# Patient Record
Sex: FEMALE | Race: WHITE | NOT HISPANIC OR LATINO | URBAN - METROPOLITAN AREA
[De-identification: names, ages, dates, MRNs, and addresses within clinical notes are randomized per-mention and may not be internally consistent; named-entity substitution may affect disease eponyms.]

---

## 2024-04-05 ENCOUNTER — EMERGENCY (EMERGENCY)
Facility: HOSPITAL | Age: 24
LOS: 1 days | Discharge: HOME | End: 2024-04-05
Attending: STUDENT IN AN ORGANIZED HEALTH CARE EDUCATION/TRAINING PROGRAM | Admitting: STUDENT IN AN ORGANIZED HEALTH CARE EDUCATION/TRAINING PROGRAM
Payer: COMMERCIAL

## 2024-04-05 VITALS
WEIGHT: 125 LBS | DIASTOLIC BLOOD PRESSURE: 78 MMHG | TEMPERATURE: 97 F | HEART RATE: 93 BPM | HEIGHT: 62 IN | RESPIRATION RATE: 18 BRPM | OXYGEN SATURATION: 97 % | SYSTOLIC BLOOD PRESSURE: 118 MMHG

## 2024-04-05 VITALS
DIASTOLIC BLOOD PRESSURE: 71 MMHG | SYSTOLIC BLOOD PRESSURE: 111 MMHG | OXYGEN SATURATION: 99 % | HEART RATE: 67 BPM | RESPIRATION RATE: 18 BRPM | TEMPERATURE: 98 F

## 2024-04-05 LAB
ALBUMIN SERPL ELPH-MCNC: 4.3 G/DL — SIGNIFICANT CHANGE UP (ref 3.3–5)
ALP SERPL-CCNC: 47 U/L — SIGNIFICANT CHANGE UP (ref 40–120)
ALT FLD-CCNC: 10 U/L — SIGNIFICANT CHANGE UP (ref 10–45)
ANION GAP SERPL CALC-SCNC: 9 MMOL/L — SIGNIFICANT CHANGE UP (ref 5–17)
APPEARANCE UR: CLEAR — SIGNIFICANT CHANGE UP
AST SERPL-CCNC: 17 U/L — SIGNIFICANT CHANGE UP (ref 10–40)
BASOPHILS # BLD AUTO: 0.03 K/UL — SIGNIFICANT CHANGE UP (ref 0–0.2)
BASOPHILS NFR BLD AUTO: 0.5 % — SIGNIFICANT CHANGE UP (ref 0–2)
BILIRUB SERPL-MCNC: 0.3 MG/DL — SIGNIFICANT CHANGE UP (ref 0.2–1.2)
BILIRUB UR-MCNC: NEGATIVE — SIGNIFICANT CHANGE UP
BUN SERPL-MCNC: 10 MG/DL — SIGNIFICANT CHANGE UP (ref 7–23)
CALCIUM SERPL-MCNC: 9.8 MG/DL — SIGNIFICANT CHANGE UP (ref 8.4–10.5)
CHLORIDE SERPL-SCNC: 102 MMOL/L — SIGNIFICANT CHANGE UP (ref 96–108)
CO2 SERPL-SCNC: 26 MMOL/L — SIGNIFICANT CHANGE UP (ref 22–31)
COLOR SPEC: YELLOW — SIGNIFICANT CHANGE UP
CREAT SERPL-MCNC: 0.72 MG/DL — SIGNIFICANT CHANGE UP (ref 0.5–1.3)
DIFF PNL FLD: NEGATIVE — SIGNIFICANT CHANGE UP
EGFR: 120 ML/MIN/1.73M2 — SIGNIFICANT CHANGE UP
EOSINOPHIL # BLD AUTO: 0.14 K/UL — SIGNIFICANT CHANGE UP (ref 0–0.5)
EOSINOPHIL NFR BLD AUTO: 2.4 % — SIGNIFICANT CHANGE UP (ref 0–6)
GLUCOSE SERPL-MCNC: 78 MG/DL — SIGNIFICANT CHANGE UP (ref 70–99)
GLUCOSE UR QL: NEGATIVE MG/DL — SIGNIFICANT CHANGE UP
HCT VFR BLD CALC: 36.3 % — SIGNIFICANT CHANGE UP (ref 34.5–45)
HGB BLD-MCNC: 12.5 G/DL — SIGNIFICANT CHANGE UP (ref 11.5–15.5)
IMM GRANULOCYTES NFR BLD AUTO: 0.2 % — SIGNIFICANT CHANGE UP (ref 0–0.9)
KETONES UR-MCNC: NEGATIVE MG/DL — SIGNIFICANT CHANGE UP
LEUKOCYTE ESTERASE UR-ACNC: NEGATIVE — SIGNIFICANT CHANGE UP
LIDOCAIN IGE QN: 23 U/L — SIGNIFICANT CHANGE UP (ref 7–60)
LYMPHOCYTES # BLD AUTO: 1.58 K/UL — SIGNIFICANT CHANGE UP (ref 1–3.3)
LYMPHOCYTES # BLD AUTO: 27.6 % — SIGNIFICANT CHANGE UP (ref 13–44)
MCHC RBC-ENTMCNC: 31.3 PG — SIGNIFICANT CHANGE UP (ref 27–34)
MCHC RBC-ENTMCNC: 34.4 GM/DL — SIGNIFICANT CHANGE UP (ref 32–36)
MCV RBC AUTO: 91 FL — SIGNIFICANT CHANGE UP (ref 80–100)
MONOCYTES # BLD AUTO: 0.64 K/UL — SIGNIFICANT CHANGE UP (ref 0–0.9)
MONOCYTES NFR BLD AUTO: 11.2 % — SIGNIFICANT CHANGE UP (ref 2–14)
NEUTROPHILS # BLD AUTO: 3.32 K/UL — SIGNIFICANT CHANGE UP (ref 1.8–7.4)
NEUTROPHILS NFR BLD AUTO: 58.1 % — SIGNIFICANT CHANGE UP (ref 43–77)
NITRITE UR-MCNC: NEGATIVE — SIGNIFICANT CHANGE UP
NRBC # BLD: 0 /100 WBCS — SIGNIFICANT CHANGE UP (ref 0–0)
PH UR: 7 — SIGNIFICANT CHANGE UP (ref 5–8)
PLATELET # BLD AUTO: 261 K/UL — SIGNIFICANT CHANGE UP (ref 150–400)
POTASSIUM SERPL-MCNC: 3.6 MMOL/L — SIGNIFICANT CHANGE UP (ref 3.5–5.3)
POTASSIUM SERPL-SCNC: 3.6 MMOL/L — SIGNIFICANT CHANGE UP (ref 3.5–5.3)
PROT SERPL-MCNC: 6.8 G/DL — SIGNIFICANT CHANGE UP (ref 6–8.3)
PROT UR-MCNC: NEGATIVE MG/DL — SIGNIFICANT CHANGE UP
RBC # BLD: 3.99 M/UL — SIGNIFICANT CHANGE UP (ref 3.8–5.2)
RBC # FLD: 12 % — SIGNIFICANT CHANGE UP (ref 10.3–14.5)
SODIUM SERPL-SCNC: 137 MMOL/L — SIGNIFICANT CHANGE UP (ref 135–145)
SP GR SPEC: 1.01 — SIGNIFICANT CHANGE UP (ref 1–1.03)
UROBILINOGEN FLD QL: 0.2 MG/DL — SIGNIFICANT CHANGE UP (ref 0.2–1)
WBC # BLD: 5.72 K/UL — SIGNIFICANT CHANGE UP (ref 3.8–10.5)
WBC # FLD AUTO: 5.72 K/UL — SIGNIFICANT CHANGE UP (ref 3.8–10.5)

## 2024-04-05 PROCEDURE — 80053 COMPREHEN METABOLIC PANEL: CPT

## 2024-04-05 PROCEDURE — 83690 ASSAY OF LIPASE: CPT

## 2024-04-05 PROCEDURE — 36415 COLL VENOUS BLD VENIPUNCTURE: CPT

## 2024-04-05 PROCEDURE — 99283 EMERGENCY DEPT VISIT LOW MDM: CPT

## 2024-04-05 PROCEDURE — 81003 URINALYSIS AUTO W/O SCOPE: CPT

## 2024-04-05 PROCEDURE — 85025 COMPLETE CBC W/AUTO DIFF WBC: CPT

## 2024-04-05 PROCEDURE — 99284 EMERGENCY DEPT VISIT MOD MDM: CPT

## 2024-04-05 RX ORDER — FAMOTIDINE 10 MG/ML
20 INJECTION INTRAVENOUS ONCE
Refills: 0 | Status: COMPLETED | OUTPATIENT
Start: 2024-04-05 | End: 2024-04-05

## 2024-04-05 RX ORDER — ACETAMINOPHEN 500 MG
650 TABLET ORAL ONCE
Refills: 0 | Status: COMPLETED | OUTPATIENT
Start: 2024-04-05 | End: 2024-04-05

## 2024-04-05 RX ADMIN — Medication 650 MILLIGRAM(S): at 16:56

## 2024-04-05 RX ADMIN — Medication 30 MILLILITER(S): at 16:56

## 2024-04-05 RX ADMIN — FAMOTIDINE 20 MILLIGRAM(S): 10 INJECTION INTRAVENOUS at 16:56

## 2024-04-05 NOTE — ED PROVIDER NOTE - PHYSICAL EXAMINATION
VITAL SIGNS: I have reviewed nursing notes and confirm.  CONSTITUTIONAL: Well appearing, in no acute distress.   SKIN:  warm and dry, no acute rash.   HEAD:  normocephalic, atraumatic.  EYES: EOM intact; conjunctiva and sclera clear.  ENT: No nasal discharge; airway clear.   NECK: Supple.  CARD: S1, S2, Regular rate and rhythm.   RESP:  Clear to auscultation b/l, no wheezes, rales or rhonchi.  ABD: Normal bowel sounds; soft; non-distended; non-tender; no guarding/ rebound.  EXT: Normal ROM. No peripheral edema. Pulses intact and equal b/l.  NEURO: Alert, oriented, grossly unremarkable

## 2024-04-05 NOTE — ED PROVIDER NOTE - PATIENT PORTAL LINK FT
You can access the FollowMyHealth Patient Portal offered by [facility] by registering at the following website: [email or web address]. By joining [employer]’s FollowMyHealth portal, you will also be able to view your health information using other applications (apps) compatible with our system.

## 2024-04-05 NOTE — ED ADULT NURSE NOTE - NSFALLUNIVINTERV_ED_ALL_ED
Bed/Stretcher in lowest position, wheels locked, appropriate side rails in place/Call bell, personal items and telephone in reach/Instruct patient to call for assistance before getting out of bed/chair/stretcher/Non-slip footwear applied when patient is off stretcher/[place] to call system/Physically safe environment - no spills, clutter or unnecessary equipment/Purposeful proactive rounding/Room/bathroom lighting operational, light cord in reach

## 2024-04-05 NOTE — ED ADULT TRIAGE NOTE - CHIEF COMPLAINT QUOTE
abd pain w constipation x 1 month , put on abx for SIBO, caused diarrhea x 1 week. abd pain w constipation x 1 month , put on abx for SIBO (small intestinal bacterial overgrowth), caused diarrhea x 1 week. came today for upper epigastric / chest discomfort with increased burping since today. EKG done. NAD @ rest. denies SOB, f/c, n/v.

## 2024-04-05 NOTE — ED PROVIDER NOTE - PROGRESS NOTE DETAILS
labs unremarkable. pt without episode of diarrhea here. reports abdominal pain improved. supportive care advised with PMD and GI f/u. stable for discharge. return precautions given

## 2024-04-05 NOTE — ED PROVIDER NOTE - OBJECTIVE STATEMENT
hx of ?SIBO presenting with epigastric pain, diarrhea for the past couple days. pt reports she had been having abdominal pain, diarrhea for months, went to GI doctor, had stool testing done, diagnosed with possible SIBO and given course of abx (neomycin, rifaximin) which she finished about 10 days ago. sx improved after finishing the abx, but this week she started having nonbloody watery diarrhea again along with epigastric pain. epigastric pain described as sharp, not worse after eating, associated with feeling gassy and bloated. denies constipation, dysuria, hematuria, vaignal discharge, recent travel, hx of abdominal surgeries. reports her partner had abdominal pain with diarrhea earlier this week.

## 2024-04-05 NOTE — ED ADULT NURSE NOTE - OBJECTIVE STATEMENT
24 yo F c/o abdominal pain w constipation x 1 month , put on abx for SIBO (small intestinal bacterial overgrowth), caused diarrhea x 1 week. came today with upper epigastric pain and chest discomfort with increased burping since today. EKG done. NAD @ rest. denies SOB, f/c, n/v.

## 2024-04-05 NOTE — ED PROVIDER NOTE - CLINICAL SUMMARY MEDICAL DECISION MAKING FREE TEXT BOX
pt presenting with abdominal pain, diarrhea for the past couple days. vitals unremarkable, abdominal exam benign, no peritoneal signs. do not suspect SBO, appendicitis, other acute surgical etiology. less likely pancreatitis, cholecystitis given sx presentation and exam findings. ddx viral gastroenteritis (given partner with recent illness), irritable bowel syndrome, dyspepsia/GERD, sequelae of abx. labs ordered.

## 2024-04-08 DIAGNOSIS — R19.7 DIARRHEA, UNSPECIFIED: ICD-10-CM

## 2024-04-08 DIAGNOSIS — R10.13 EPIGASTRIC PAIN: ICD-10-CM

## 2024-04-24 ENCOUNTER — EMERGENCY (EMERGENCY)
Facility: HOSPITAL | Age: 24
LOS: 1 days | Discharge: HOME | End: 2024-04-24
Attending: STUDENT IN AN ORGANIZED HEALTH CARE EDUCATION/TRAINING PROGRAM | Admitting: STUDENT IN AN ORGANIZED HEALTH CARE EDUCATION/TRAINING PROGRAM
Payer: COMMERCIAL

## 2024-04-24 VITALS
OXYGEN SATURATION: 97 % | RESPIRATION RATE: 17 BRPM | HEIGHT: 62 IN | HEART RATE: 87 BPM | SYSTOLIC BLOOD PRESSURE: 116 MMHG | DIASTOLIC BLOOD PRESSURE: 75 MMHG | TEMPERATURE: 98 F | WEIGHT: 126.1 LBS

## 2024-04-24 VITALS
DIASTOLIC BLOOD PRESSURE: 72 MMHG | RESPIRATION RATE: 16 BRPM | TEMPERATURE: 98 F | SYSTOLIC BLOOD PRESSURE: 110 MMHG | OXYGEN SATURATION: 98 % | HEART RATE: 80 BPM

## 2024-04-24 LAB
ALBUMIN SERPL ELPH-MCNC: 4.5 G/DL — SIGNIFICANT CHANGE UP (ref 3.3–5)
ALP SERPL-CCNC: 40 U/L — SIGNIFICANT CHANGE UP (ref 40–120)
ALT FLD-CCNC: 12 U/L — SIGNIFICANT CHANGE UP (ref 10–45)
ANION GAP SERPL CALC-SCNC: 10 MMOL/L — SIGNIFICANT CHANGE UP (ref 5–17)
AST SERPL-CCNC: 16 U/L — SIGNIFICANT CHANGE UP (ref 10–40)
BASOPHILS # BLD AUTO: 0.05 K/UL — SIGNIFICANT CHANGE UP (ref 0–0.2)
BASOPHILS NFR BLD AUTO: 0.7 % — SIGNIFICANT CHANGE UP (ref 0–2)
BILIRUB DIRECT SERPL-MCNC: 0.2 MG/DL — SIGNIFICANT CHANGE UP (ref 0–0.3)
BILIRUB INDIRECT FLD-MCNC: 0.1 MG/DL — LOW (ref 0.2–1)
BILIRUB SERPL-MCNC: 0.3 MG/DL — SIGNIFICANT CHANGE UP (ref 0.2–1.2)
BUN SERPL-MCNC: 11 MG/DL — SIGNIFICANT CHANGE UP (ref 7–23)
CALCIUM SERPL-MCNC: 9.8 MG/DL — SIGNIFICANT CHANGE UP (ref 8.4–10.5)
CHLORIDE SERPL-SCNC: 102 MMOL/L — SIGNIFICANT CHANGE UP (ref 96–108)
CO2 SERPL-SCNC: 25 MMOL/L — SIGNIFICANT CHANGE UP (ref 22–31)
CREAT SERPL-MCNC: 1.05 MG/DL — SIGNIFICANT CHANGE UP (ref 0.5–1.3)
EGFR: 76 ML/MIN/1.73M2 — SIGNIFICANT CHANGE UP
EOSINOPHIL # BLD AUTO: 0.12 K/UL — SIGNIFICANT CHANGE UP (ref 0–0.5)
EOSINOPHIL NFR BLD AUTO: 1.7 % — SIGNIFICANT CHANGE UP (ref 0–6)
GLUCOSE SERPL-MCNC: 75 MG/DL — SIGNIFICANT CHANGE UP (ref 70–99)
HCT VFR BLD CALC: 38.3 % — SIGNIFICANT CHANGE UP (ref 34.5–45)
HGB BLD-MCNC: 12.9 G/DL — SIGNIFICANT CHANGE UP (ref 11.5–15.5)
IMM GRANULOCYTES NFR BLD AUTO: 0.1 % — SIGNIFICANT CHANGE UP (ref 0–0.9)
LIDOCAIN IGE QN: 30 U/L — SIGNIFICANT CHANGE UP (ref 7–60)
LYMPHOCYTES # BLD AUTO: 2.98 K/UL — SIGNIFICANT CHANGE UP (ref 1–3.3)
LYMPHOCYTES # BLD AUTO: 41 % — SIGNIFICANT CHANGE UP (ref 13–44)
MCHC RBC-ENTMCNC: 31.3 PG — SIGNIFICANT CHANGE UP (ref 27–34)
MCHC RBC-ENTMCNC: 33.7 GM/DL — SIGNIFICANT CHANGE UP (ref 32–36)
MCV RBC AUTO: 93 FL — SIGNIFICANT CHANGE UP (ref 80–100)
MONOCYTES # BLD AUTO: 0.68 K/UL — SIGNIFICANT CHANGE UP (ref 0–0.9)
MONOCYTES NFR BLD AUTO: 9.4 % — SIGNIFICANT CHANGE UP (ref 2–14)
NEUTROPHILS # BLD AUTO: 3.42 K/UL — SIGNIFICANT CHANGE UP (ref 1.8–7.4)
NEUTROPHILS NFR BLD AUTO: 47.1 % — SIGNIFICANT CHANGE UP (ref 43–77)
NRBC # BLD: 0 /100 WBCS — SIGNIFICANT CHANGE UP (ref 0–0)
PLATELET # BLD AUTO: 295 K/UL — SIGNIFICANT CHANGE UP (ref 150–400)
POTASSIUM SERPL-MCNC: 3.8 MMOL/L — SIGNIFICANT CHANGE UP (ref 3.5–5.3)
POTASSIUM SERPL-SCNC: 3.8 MMOL/L — SIGNIFICANT CHANGE UP (ref 3.5–5.3)
PROT SERPL-MCNC: 7.2 G/DL — SIGNIFICANT CHANGE UP (ref 6–8.3)
RBC # BLD: 4.12 M/UL — SIGNIFICANT CHANGE UP (ref 3.8–5.2)
RBC # FLD: 11.9 % — SIGNIFICANT CHANGE UP (ref 10.3–14.5)
SODIUM SERPL-SCNC: 137 MMOL/L — SIGNIFICANT CHANGE UP (ref 135–145)
WBC # BLD: 7.26 K/UL — SIGNIFICANT CHANGE UP (ref 3.8–10.5)
WBC # FLD AUTO: 7.26 K/UL — SIGNIFICANT CHANGE UP (ref 3.8–10.5)

## 2024-04-24 PROCEDURE — 81001 URINALYSIS AUTO W/SCOPE: CPT

## 2024-04-24 PROCEDURE — 76830 TRANSVAGINAL US NON-OB: CPT

## 2024-04-24 PROCEDURE — 80076 HEPATIC FUNCTION PANEL: CPT

## 2024-04-24 PROCEDURE — 80048 BASIC METABOLIC PNL TOTAL CA: CPT

## 2024-04-24 PROCEDURE — 81025 URINE PREGNANCY TEST: CPT

## 2024-04-24 PROCEDURE — 36415 COLL VENOUS BLD VENIPUNCTURE: CPT

## 2024-04-24 PROCEDURE — 99284 EMERGENCY DEPT VISIT MOD MDM: CPT

## 2024-04-24 PROCEDURE — 76856 US EXAM PELVIC COMPLETE: CPT | Mod: 26

## 2024-04-24 PROCEDURE — 83690 ASSAY OF LIPASE: CPT

## 2024-04-24 PROCEDURE — 76830 TRANSVAGINAL US NON-OB: CPT | Mod: 26

## 2024-04-24 PROCEDURE — 99284 EMERGENCY DEPT VISIT MOD MDM: CPT | Mod: 25

## 2024-04-24 PROCEDURE — 85025 COMPLETE CBC W/AUTO DIFF WBC: CPT

## 2024-04-24 PROCEDURE — 76856 US EXAM PELVIC COMPLETE: CPT

## 2024-04-24 NOTE — ED PROVIDER NOTE - NSFOLLOWUPINSTRUCTIONS_ED_ALL_ED_FT
The patient is a 31y Male complaining of flank pain. Pelvic Pain, Female  Pelvic pain is pain in your lower abdomen, below your belly button and between your hips. The pain may start suddenly (be acute), keep coming back (be recurring), or last a long time (become chronic). Pelvic pain that lasts longer than 6 months is considered chronic.    Pelvic pain may affect your:  Reproductive organs.  Urinary system.  Digestive tract.  Musculoskeletal system.  There are many potential causes of pelvic pain. Sometimes, the pain can be a result of digestive or urinary conditions, strained muscles or ligaments, or reproductive conditions. Sometimes the cause of pelvic pain is not known.    Follow these instructions at home:  Person sitting at a desk and writing in a notebook.  Take over-the-counter and prescription medicines only as told by your health care provider.  Rest as told by your health care provider.  Do not have sex if it hurts.  Keep a journal of your pelvic pain. Write down:  When the pain started.  Where the pain is located.  What seems to make the pain better or worse, such as food or your monthly period (menstrual cycle).  Any symptoms you have along with the pain.  Keep all follow-up visits. This is important.  Contact a health care provider if:  Medicine does not help your pain, or your pain comes back.  You have new symptoms.  You have abnormal vaginal discharge or bleeding, including bleeding after menopause.  You have a fever or chills.  You are constipated.  You have blood in your urine or stool (feces).  You have foul-smelling urine.  You feel weak or light-headed.  Get help right away if:  You have sudden severe pain.  Your pain gets steadily worse.  You have severe pain along with fever, nausea, vomiting, or excessive sweating.  You lose consciousness.  These symptoms may represent a serious problem that is an emergency. Do not wait to see if the symptoms will go away. Get medical help right away. Call your local emergency services (911 in the [place] Do not drive yourself to the hospital.    Summary  Pelvic pain is pain in your lower abdomen, below your belly button and between your hips.  There are many potential causes of pelvic pain.  Keep a journal of your pelvic pain.  This information is not intended to replace advice given to you by your health care provider. Make sure you discuss any questions you have with your health care provider.    ---    Lower Gastrointestinal Bleeding  Body outline showing the colon and the rectum.   Lower gastrointestinal (GI) bleeding is bleeding from the colon, the rectum, or the anus. The colon is the last part of the digestive tract, where stool (feces) is formed. A person with lower GI bleeding may see blood in the stool. The blood may be bright red.    Lower GI bleeding often stops without treatment. Heavy bleeding and bleeding that does not go away may be life-threatening and may need emergency treatment at a hospital.    What are the causes?  This condition may be caused by:  Diverticulosis. This condition causes pouches to form in your colon over time.  Diverticulitis. This is inflammation in areas where diverticulosis has occurred.  Inflammatory bowel disease (IBD), or inflammation of the colon.  Hemorrhoids, or swollen veins in the rectum.  Anal fissures, or painful tears in the anus. Tears are often caused by passing hard stools.  Cancer of the colon or rectum, or polyps of the colon or rectum. Polyps are noncancerous growths.  Coagulopathy. This is a disorder that makes it hard to form blood clots. This condition also causes easy bleeding.  Arteriovenous malformation. This is an abnormal weakening of a blood vessel where an artery and a vein come together.  What increases the risk?  The following factors may make you more likely to develop this condition:  Being older than age 60.  Regular use of aspirin or NSAIDs, such as ibuprofen.  Taking blood thinners (anticoagulants) or anti-platelet medicines.  Having a history of high-dose X-ray treatment (radiation therapy) of the colon.  Having recently had a colon polyp removed.  Heavy use of alcohol, or use of nicotine products.  What are the signs or symptoms?  Symptoms of this condition include:  Bright red blood or blood clots coming from your rectum.  Bloody stools.  Black or maroon-colored stools.  Pain or cramping in your abdomen.  Weakness or dizziness.  Racing heartbeat.  How is this diagnosed?  This condition may be diagnosed based on:  Your symptoms and medical history.  A physical exam. During the exam, your health care provider will check for signs of blood loss, such as low blood pressure and a fast pulse.  Tests or procedures. These may include:  Flexible sigmoidoscopy. A flexible tube with a camera is used to check your anus and colon to look for the source of bleeding.  Colonoscopy. This is similar to a flexible sigmoidoscopy, but the camera can extend all the way to the uppermost part of your colon.  Blood tests to measure your red blood cell count and to check for coagulopathy.  Angiogram. For this test, X-rays of your colon are taken after a dye or radioactive substance is injected into your bloodstream. This may be done to look for a bleeding site.  How is this treated?  Treatment for this condition depends on the cause of your bleeding. Heavy or ongoing bleeding is treated at a hospital. Treatment may include:  Getting fluids through an IV inserted into one of your veins.  Getting blood through an IV (blood transfusion).  Stopping bleeding with high heat (cauterization), injections of certain medicines, or surgical clips. This can all be done during a colonoscopy.  Having a procedure that involves first doing an angiogram and then blocking blood flow to the bleeding site (embolization).  Stopping some of your regular medicines for a certain amount of time.  Having surgery to remove part of your colon. This may be needed if bleeding is severe and does not lessen after other treatment.  Follow these instructions at home:  Eating and drinking    A sign telling the reader not to drink beer, wine, or hard liquor.  Eat foods that are high in fiber. This will help keep your stools soft. These foods include whole grains, legumes, fruits, and vegetables. Eating 1–3 prunes a day works well for many people.  Drink enough fluid to keep your urine pale yellow.  Do not drink alcohol.  General instructions    A sign showing that a person should not smoke.  Do not use any products that contain nicotine or tobacco. These products include cigarettes, chewing tobacco, and vaping devices, such as e-cigarettes. If you need help quitting, ask your health care provider.  Take over-the-counter and prescription medicines only as told by your health care provider. You may need to avoid aspirin, NSAIDs, or other medicines that increase bleeding.  Return to your normal activities as told by your health care provider. Ask your health care provider what activities are safe for you.  Keep all follow-up visits. This is important.  Contact a health care provider if:  Your symptoms do not improve.  You feel nauseous, or you vomit.  You have pain in your abdomen.  You feel weak or dizzy.  You need help to stop smoking or drinking alcohol.  Get help right away if:  Your bleeding increases.  You have severe weakness or you faint.  You have sudden or severe cramps in your back or abdomen.  You vomit blood.  You pass large blood clots in your stool.  Any of your other symptoms get worse.  These symptoms may be an emergency. Get help right away. Call 911.  Do not wait to see if the symptoms will go away.  Do not drive yourself to the hospital.  Summary  If you have lower gastrointestinal (GI) bleeding, you may see blood in or on your stool (feces). The blood may be bright red.  Take over-the-counter and prescription medicines only as told by your health care provider. You may need to avoid aspirin, NSAIDs, or other medicines that increase bleeding.  Keep all follow-up visits. This is important.  Get help right away if your symptoms get worse.  This information is not intended to replace advice given to you by your health care provider. Make sure you discuss any questions you have with your health care provider.

## 2024-04-24 NOTE — ED PROVIDER NOTE - PATIENT PORTAL LINK FT
You can access the FollowMyHealth Patient Portal offered by [facility] by registering at the following website: [email or web address]. By joining [employer]’s FollowMyHealth portal, you will also be able to view your health information using other applications (apps) compatible with our system.

## 2024-04-24 NOTE — ED PROVIDER NOTE - PHYSICAL EXAMINATION
General: comfortable, resting in ED  HEENT: atraumatic, no eye erythema or discharge  Pulm: no cyanosis, no added work of breathing  Cardiac: no pallor, intact peripheral pulse  GI: no abdominal distension, no tenderness in all 4 quadrants to deep palpation  Rectal (with RN Josué): single perirectal skin tag, no surrounding erythema / fluctuance / induration / fissure, brown stool in rectal vault with no gross blood  Neuro: alert, conversant  Psych: neutral affect, cooperative  Msk: no gross deformity or instability  Skin: no erythema or rash

## 2024-04-24 NOTE — ED PROVIDER NOTE - PROGRESS NOTE DETAILS
Labs US and UA without acute process (UA contaminated, but nitrite/LE negative not concerning for UTI).  Plan to discharge home with return precautions and outpatient followup.

## 2024-04-24 NOTE — ED PROVIDER NOTE - OBJECTIVE STATEMENT
23F with chronic intermittent diarrhea s/p GI evaluation with possible SIBO diagnosis s/p abx with some relief, hemorrhoid, now with 1 week of diarrhea.  Started to have LLQ/pelvic pain yesterday and had one episode of bloody diarrhea today.  No bleeding between episodes.  No vomiting / fever / dysuria / hematuria.  Possible urinary frequency vs previous diagnosis of overactive bladder.  No vaginal bleeding/discharge.

## 2024-04-24 NOTE — ED ADULT NURSE REASSESSMENT NOTE - NS ED NURSE REASSESS COMMENT FT1
All labs, US resulted, no pain or any new symptom complaint.  Vital signs stable. Discharged to home in stable condition.

## 2024-04-24 NOTE — ED PROVIDER NOTE - CLINICAL SUMMARY MEDICAL DECISION MAKING FREE TEXT BOX
# rectal bleeding and diarrhea: possibly secondary to hemorrhoid vs other spurious cause.  No active LGIB identified.  R/o significant anemia 2/2 bleeding.  R/o gross metabolic abnormality ?hypoglycemia ?hypernatremia ?joi 2/2 diarrhea.    # left pelvic pain: must r/o torsion or TOA in female patient.  R/o pregnancy ?ectopic pregnancy as cause for pain.  R/o UTI/cystitis.  Discussed risks and benefits of CTAP r/o kidney stone or other structural cause of pain, patient will discuss with mother and decide on diagnostic imaging vs expectant management.

## 2024-04-24 NOTE — ED ADULT TRIAGE NOTE - CHIEF COMPLAINT QUOTE
24 y/o F with pmhx hemorrhoid and "stomach problems" x3 months presents to the ED c/o diarrhea x1 week and x1 episode of blood with BM today. Notes being seen here 1 month ago for abdominal pain with a negative workup. EKG completed in triage.

## 2024-04-24 NOTE — ED ADULT NURSE REASSESSMENT NOTE - NS ED NURSE REASSESS COMMENT FT1
Patient a/oX 3, c/o of lower abdominal pain w/ episode of blood stained stools, no dizziness/lightheadedness.  Vital signs stable.  Left AC PIV #20 in place, all labs sent,n o complications.  US done.  Results and disposition pending.

## 2024-04-24 NOTE — ED PROVIDER NOTE - NSFOLLOWUPCLINICS_GEN_ALL_ED_FT
[facility]  Family Medicine  [address]  [place], [place] [address]  Phone: [phone]  Fax:

## 2024-04-24 NOTE — ED ADULT NURSE NOTE - OBJECTIVE STATEMENT
Patient c/o of left sided abdominal pain, radiates to pelvic and back area, states intermittent X months, noted blood stained stools X 1 day w/ diarreha, sttes has shortness of breath, no lightheadedness/dizziness.  States h as Hx of hemmorhoids.  EKG NSR in ED.

## 2024-04-27 DIAGNOSIS — K62.5 HEMORRHAGE OF ANUS AND RECTUM: ICD-10-CM

## 2024-04-27 DIAGNOSIS — R10.32 LEFT LOWER QUADRANT PAIN: ICD-10-CM

## 2024-04-27 DIAGNOSIS — R19.7 DIARRHEA, UNSPECIFIED: ICD-10-CM

## 2024-04-27 DIAGNOSIS — R10.2 PELVIC AND PERINEAL PAIN: ICD-10-CM
